# Patient Record
Sex: FEMALE | Race: OTHER | NOT HISPANIC OR LATINO
[De-identification: names, ages, dates, MRNs, and addresses within clinical notes are randomized per-mention and may not be internally consistent; named-entity substitution may affect disease eponyms.]

---

## 2019-07-25 PROBLEM — Z00.00 ENCOUNTER FOR PREVENTIVE HEALTH EXAMINATION: Status: ACTIVE | Noted: 2019-07-25

## 2019-07-26 ENCOUNTER — MEDICATION RENEWAL (OUTPATIENT)
Age: 59
End: 2019-07-26

## 2019-07-27 ENCOUNTER — MEDICATION RENEWAL (OUTPATIENT)
Age: 59
End: 2019-07-27

## 2019-10-23 ENCOUNTER — APPOINTMENT (OUTPATIENT)
Dept: NEUROLOGY | Facility: CLINIC | Age: 59
End: 2019-10-23
Payer: COMMERCIAL

## 2019-10-23 VITALS
HEART RATE: 79 BPM | SYSTOLIC BLOOD PRESSURE: 120 MMHG | BODY MASS INDEX: 24.43 KG/M2 | OXYGEN SATURATION: 99 % | DIASTOLIC BLOOD PRESSURE: 74 MMHG | TEMPERATURE: 97.3 F | HEIGHT: 65 IN | WEIGHT: 146.6 LBS

## 2019-10-23 PROCEDURE — 99214 OFFICE O/P EST MOD 30 MIN: CPT

## 2019-10-24 NOTE — ASSESSMENT
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 The course of last 2 years she did have 4 episodes of at least 3 of them are highly suggestive of syncopal. The last episode brings up the concern with a seizure .in my opinion the timing of being out is not highly suggestive of syncope although the length of the time is also not clear. The symptoms prior to the passing out are also not typical for Syncope.\par I would do a blood level and and EEG and reviewed decide on the dosing and also whether he should do a more prolonged study.

## 2019-10-24 NOTE — HISTORY OF PRESENT ILLNESS
[FreeTextEntry1] : Va is here for followup. She has been seeing me from early 2004 having refractory epilepsy syndrome. 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 Blood issue that they have been having where almost 4 episodes of passing out in the course of last couple of years. At least 2 of the episodes were associated to be experiencing pain followed by that syncopal event. One episode was also immediately after getting blood.\par The last event of the series happened in September 2019. In my opinion this episode is a slightly different activities some aspects of the period she says on that night she went to bowling throughout the day starting from the school she was feeling nauseous and sick in her stomach\par She went to bed like this done sometime early in the morning woke up felt that she has to go to bathroom and the next thing that she remembers is waking up in the bathroom floor bowel incontinence. She says she just cleaned the floor and apparently went back to her bed and slept next morning waking up she felt significant pain in her chest and as it appears she hasn't picture of it she had significant bruises over the top of the left shoulder and left side of the face. She went to the emergency room and it was found out that she had left clavicle fracture. She stayed out of work for a few weeks and then back recently. She denies having had any episode that she couldn't recall seizure she is back to herself her mood is good.\par She denies missing any doses of medication or having any unusual sleep pattern or infection in that period.

## 2019-10-24 NOTE — PHYSICAL EXAM
[FreeTextEntry1] : Va is awake alert oriented x3 in a good mood. She lost some weight. She denies having any issues with appetite. She shows good concentration and attention her memory recall is 3 out of 3 executive behavior is normal. Her pain nerve motor and sensory exam are also normal there is no field cut.\par Romberg is negative.

## 2019-11-12 ENCOUNTER — APPOINTMENT (OUTPATIENT)
Dept: NEUROLOGY | Facility: CLINIC | Age: 59
End: 2019-11-12
Payer: COMMERCIAL

## 2019-11-12 PROCEDURE — 95816 EEG AWAKE AND DROWSY: CPT

## 2020-10-20 ENCOUNTER — APPOINTMENT (OUTPATIENT)
Dept: NEUROLOGY | Facility: CLINIC | Age: 60
End: 2020-10-20
Payer: COMMERCIAL

## 2020-10-20 VITALS
TEMPERATURE: 97.3 F | OXYGEN SATURATION: 98 % | HEART RATE: 68 BPM | HEIGHT: 65 IN | SYSTOLIC BLOOD PRESSURE: 122 MMHG | BODY MASS INDEX: 25.49 KG/M2 | DIASTOLIC BLOOD PRESSURE: 78 MMHG | WEIGHT: 153 LBS

## 2020-10-20 PROCEDURE — 99072 ADDL SUPL MATRL&STAF TM PHE: CPT

## 2020-10-20 PROCEDURE — 99214 OFFICE O/P EST MOD 30 MIN: CPT

## 2020-10-21 NOTE — HISTORY OF PRESENT ILLNESS
[FreeTextEntry1] : Todd is here for the F/U. she is working from home and denies having had any episode suggestive of seizure. She  is maintaining a regular schedule of sleep and daily activities. Did not gain weight and her moods are good.She is planning to retire in 6 months. Expecting a grand child and is excited about it.\par No issues with memory.\kriss Does not do a regular exercise but is going for walks frequently\par Did not visit her PMD .Not complaining of spinal or joint pain\kriss Did not have a dizzy  or presyncopal event

## 2020-10-21 NOTE — PHYSICAL EXAM
[FreeTextEntry1] : A/A/OX 3\par good mood /slightly flat affect\par follows 4 step commands\par crosses the midline well\par  good attention\par normal language\par normal motor\par stable gait\par no dysmetria\par Good posture\par negative Romberg\par

## 2020-10-21 NOTE — ASSESSMENT
[FreeTextEntry1] : S/P right temporal lobectomy for refractory partial epilepsy\par Doing well\par \par Plan\par will continue the current dose of the Pb\par F/U \par Blood test\par

## 2021-04-27 ENCOUNTER — APPOINTMENT (OUTPATIENT)
Dept: NEUROLOGY | Facility: CLINIC | Age: 61
End: 2021-04-27
Payer: COMMERCIAL

## 2021-04-27 VITALS
DIASTOLIC BLOOD PRESSURE: 70 MMHG | HEIGHT: 64 IN | TEMPERATURE: 97.7 F | OXYGEN SATURATION: 99 % | HEART RATE: 82 BPM | BODY MASS INDEX: 26.29 KG/M2 | WEIGHT: 154 LBS | SYSTOLIC BLOOD PRESSURE: 120 MMHG

## 2021-04-27 PROCEDURE — 99213 OFFICE O/P EST LOW 20 MIN: CPT

## 2021-04-27 PROCEDURE — 99072 ADDL SUPL MATRL&STAF TM PHE: CPT

## 2021-04-30 NOTE — PHYSICAL EXAM
[FreeTextEntry1] : A/A/Ox3\par follows 4 step commands\par crosses the midline well\par good mood\par good attention\par normal language\par CN-- normal\par motor-- normal\par sensory-- normal\par stable gait

## 2021-04-30 NOTE — ASSESSMENT
[FreeTextEntry1] : S/P  Right temporal lobectomy for refractory seizures\par doing very well\par no known seizures since surgery\par \par She wants to come off AEd\par considering the EEG done the decision has been to continue the Pb.\par Now considering the osteoporosis will  start switching her to Keppra and taper off the Pb.\par will send her the schedule for tapering and titrating and F/U

## 2021-04-30 NOTE — HISTORY OF PRESENT ILLNESS
[FreeTextEntry1] : Todd is here for the F/u\par She is doing well in regard to the seizures.\par No seizures. no events suspicious for sz, She says her mood is good. Now is more relaxed.\par planning to retire. sleeps good. feels rested in the morning\par No excessive sleepiness during the day.\par No HA\par No cervical pain\par Occasionally feels pain in the calves.\par She says she had a bone density scan done through her PMD that showed osteoporosis.\par She also had a blood  test done that shows normal VIt D and a phenobarbital level of 19.2 and. high cholesterol.\par \par

## 2021-05-07 RX ORDER — UREA 10 %
50 LOTION (ML) TOPICAL DAILY
Qty: 30 | Refills: 3 | Status: ACTIVE | COMMUNITY
Start: 2021-05-07 | End: 1900-01-01

## 2021-10-28 ENCOUNTER — APPOINTMENT (OUTPATIENT)
Dept: NEUROLOGY | Facility: CLINIC | Age: 61
End: 2021-10-28
Payer: COMMERCIAL

## 2021-10-28 VITALS
SYSTOLIC BLOOD PRESSURE: 122 MMHG | OXYGEN SATURATION: 99 % | WEIGHT: 151 LBS | DIASTOLIC BLOOD PRESSURE: 70 MMHG | TEMPERATURE: 97.9 F | HEIGHT: 64 IN | BODY MASS INDEX: 25.78 KG/M2 | HEART RATE: 69 BPM

## 2021-10-28 PROCEDURE — 99213 OFFICE O/P EST LOW 20 MIN: CPT

## 2021-10-29 NOTE — ASSESSMENT
[FreeTextEntry1] : S/P right temporal lobectomy\par Osteopenia\par \par \par Plan\par continue with the plan to taper off the Pb.\par continue Keppra\par F/U

## 2021-10-29 NOTE — HISTORY OF PRESENT ILLNESS
[FreeTextEntry1] : Todd is here for the F/u. She is doing very well. not reporting any episodes/ events\par She is tolerating Keppra well.\par she is still taking one tablet of 60 mg phenobab every night.\par She is happy that she is retired. now has a grand daughter that she babysits 2 days/week\par She is also going to a [employer] with swimming pool. happy about it.\par sleeps well\par did not gain weight'\par did have her blood test done that shows a keppra level of 11.9\par no neck or back pain\par describes her mood as good\par

## 2021-10-29 NOTE — PHYSICAL EXAM
[FreeTextEntry1] : A/A/Ox3\par follows 4 step commands \par crosses the mid line well\par good mood\par normal language\par good attention\par Cn- normal\par motor--prieto\par stable gait

## 2022-04-25 ENCOUNTER — APPOINTMENT (OUTPATIENT)
Dept: NEUROLOGY | Facility: CLINIC | Age: 62
End: 2022-04-25
Payer: COMMERCIAL

## 2022-04-25 VITALS
BODY MASS INDEX: 25.61 KG/M2 | OXYGEN SATURATION: 96 % | WEIGHT: 150 LBS | HEART RATE: 71 BPM | SYSTOLIC BLOOD PRESSURE: 120 MMHG | HEIGHT: 64 IN | DIASTOLIC BLOOD PRESSURE: 76 MMHG | TEMPERATURE: 97.8 F

## 2022-04-25 PROCEDURE — 95816 EEG AWAKE AND DROWSY: CPT

## 2022-04-25 PROCEDURE — 99213 OFFICE O/P EST LOW 20 MIN: CPT

## 2022-04-26 NOTE — HISTORY OF PRESENT ILLNESS
[FreeTextEntry1] : Todd is here for the F/u. she appears tired  due to the fact that last night her daughter has had a significant rash and she was up from 2 am.\par She is doing well. No major issues.\par she is now retired . Goes to [employer] twice a week. Everyday as a habit wakes up early to watch the sunrise,\par She usually goes to bed between 10.30 and 11.30.\par The number of hours that she gets is about 6 hours and during that also wakes up to go to the bathroom.\par despite that for the most part does not feel tired during the day and does not have excessive sleepiness.\par She says her mood and memory are the same.\par She keeps herself active\par No events suspicious for seizure.\par No HA\par No spinal pain\par did not do her blood level\par Did an EEG today in the office

## 2022-04-26 NOTE — PHYSICAL EXAM
[FreeTextEntry1] : A/A/Ox3\par good mood \par good attention\par follows 4 step commands\par crosses the midline well\par No drift\par No dysmetyria\par stable gait\par

## 2022-04-26 NOTE — ASSESSMENT
[FreeTextEntry1] : S/P right temporal lobectomy for refractory epilepsy\par \par doing well\par will continue current dose\par F/u\par level

## 2022-12-08 ENCOUNTER — APPOINTMENT (OUTPATIENT)
Dept: NEUROLOGY | Facility: CLINIC | Age: 62
End: 2022-12-08

## 2022-12-08 VITALS
DIASTOLIC BLOOD PRESSURE: 80 MMHG | SYSTOLIC BLOOD PRESSURE: 129 MMHG | BODY MASS INDEX: 27.33 KG/M2 | HEART RATE: 88 BPM | TEMPERATURE: 97.6 F | WEIGHT: 166 LBS | OXYGEN SATURATION: 97 % | HEIGHT: 65.5 IN

## 2022-12-08 PROCEDURE — 99214 OFFICE O/P EST MOD 30 MIN: CPT

## 2022-12-08 RX ORDER — PHENOBARBITAL 60 MG/1
60 TABLET ORAL TWICE DAILY
Qty: 180 | Refills: 0 | Status: DISCONTINUED | COMMUNITY
Start: 2019-07-26 | End: 2022-12-08

## 2022-12-08 NOTE — ASSESSMENT
[FreeTextEntry1] : S/P right temporal lobe resection for epileopsy\par doing well . \par \par plan levels F/U

## 2022-12-08 NOTE — HISTORY OF PRESENT ILLNESS
[FreeTextEntry1] : Todd, is here for the F/U. she came with her 1 Y/O grand son. She is happy and denies having had any events suggestive of seizure. She sleeps well. good mood\par Keeps herself busy.\par Saw her PMD  couple of months ago . she says now she is having occasional neck tension and pain and also LBP\par No  radiating pain to the arms or legs.\par no falls\par At times she also feels heavy ness and tension in the head.\par No Change in the bladder'\par NO change in the balance and the vision\par a blood test done in May , shows normal CBC , CMP and Keppra level of 16.1 and Vit D 35\par

## 2022-12-08 NOTE — PHYSICAL EXAM
[FreeTextEntry1] : A/A/Ox3\par good mood \par good attention\par follows 4 step commands\par normal language\par CN- normal\par NO drift\par no dysmetria'\par stable gaitaffecting right turn more than left\par slight pain and tension in the neck in surrender position

## 2023-06-01 ENCOUNTER — APPOINTMENT (OUTPATIENT)
Dept: NEUROLOGY | Facility: CLINIC | Age: 63
End: 2023-06-01
Payer: COMMERCIAL

## 2023-06-01 VITALS
HEIGHT: 65.5 IN | HEART RATE: 68 BPM | SYSTOLIC BLOOD PRESSURE: 146 MMHG | BODY MASS INDEX: 25.52 KG/M2 | OXYGEN SATURATION: 98 % | DIASTOLIC BLOOD PRESSURE: 90 MMHG | TEMPERATURE: 98.6 F | WEIGHT: 155 LBS

## 2023-06-01 VITALS — SYSTOLIC BLOOD PRESSURE: 134 MMHG | HEART RATE: 75 BPM | DIASTOLIC BLOOD PRESSURE: 91 MMHG

## 2023-06-01 PROCEDURE — 99214 OFFICE O/P EST MOD 30 MIN: CPT

## 2023-06-02 NOTE — PHYSICAL EXAM
[FreeTextEntry1] : A/A/Ox3\par follows step commands\par good mood\par slightly tense and tight\par no nystagmus. CN exam-- normal\par guardado lateralizing to the left\par no drift\par no dysmetria\par negative Romberg\par stable gait \par sable to walk Tantum\par \par

## 2023-06-02 NOTE — HISTORY OF PRESENT ILLNESS
[FreeTextEntry1] : Todd is here for the F/U after having a sudden onset of vertigo last week.\par According to her she woke up to go to the bathroom . felt the room was spinning\par she was also slightly nauseous.\par She manage to go to the bathroom . coming back and sitting in the bed was slightly better but it really became worse/ severe after she tried to lay back.\par This was so severe that she called her son , they went to ED. Some W/U was done and all were negative\par She was given Mclezine and did slightly better with some fluctuations\par Not having a F/U appointment with me , she saw another neurologist in [facility] d also has an appointment with ENT\par She says throughout the whole event this was the only symptom that she had.\par Denies double vison, difficulty with speaking, blurred vision , weakness and or dysesthesia\par No HA\par Gradually doing better\par no cervical pain\par slight ear pain\par not particularly complaining of hearing issues\par \par

## 2023-06-02 NOTE — ASSESSMENT
[FreeTextEntry1] : 1- s/p right temporal lobectomy for refractory epilepsy\par 2- vertigo. ( peripheral\par \par \par plan F/U ENT\par Vestibular rehab

## 2023-06-21 ENCOUNTER — APPOINTMENT (OUTPATIENT)
Dept: OTOLARYNGOLOGY | Facility: CLINIC | Age: 63
End: 2023-06-21
Payer: COMMERCIAL

## 2023-06-21 PROCEDURE — 99204 OFFICE O/P NEW MOD 45 MIN: CPT

## 2023-06-21 RX ORDER — ALENDRONATE SODIUM 35 MG/1
TABLET ORAL
Refills: 0 | Status: ACTIVE | COMMUNITY

## 2023-06-21 NOTE — HISTORY OF PRESENT ILLNESS
[de-identified] : 63F presents with concern for dizziness 3 weeks ago. Started when she got up out of bed to go to the bathroom. She reports "the whole world was spinning." This occurs when she changes positions quickly. She also has imbalance as well. She's been taking Meclizine PRN. Feels like she may have some hearing loss and bilateral tinnitus. Denies otalgia, otorrhea. She's been doing vestibular therapy. No other ENT issues.

## 2023-07-10 ENCOUNTER — APPOINTMENT (OUTPATIENT)
Dept: OTOLARYNGOLOGY | Facility: CLINIC | Age: 63
End: 2023-07-10
Payer: COMMERCIAL

## 2023-07-10 PROCEDURE — 92550 TYMPANOMETRY & REFLEX THRESH: CPT | Mod: 52

## 2023-07-10 PROCEDURE — 92557 COMPREHENSIVE HEARING TEST: CPT

## 2023-07-10 PROCEDURE — 92540 BASIC VESTIBULAR EVALUATION: CPT

## 2023-07-10 PROCEDURE — 92537 CALORIC VSTBLR TEST W/REC: CPT

## 2023-07-14 ENCOUNTER — APPOINTMENT (OUTPATIENT)
Dept: OTOLARYNGOLOGY | Facility: CLINIC | Age: 63
End: 2023-07-14
Payer: COMMERCIAL

## 2023-07-14 PROCEDURE — 99214 OFFICE O/P EST MOD 30 MIN: CPT

## 2023-07-14 RX ORDER — MECLIZINE HYDROCHLORIDE 12.5 MG/1
12.5 TABLET ORAL 3 TIMES DAILY
Qty: 36 | Refills: 3 | Status: ACTIVE | COMMUNITY
Start: 2023-07-14 | End: 1900-01-01

## 2023-07-14 NOTE — DATA REVIEWED
[de-identified] : -\par 07/10/2023.\par \par Summary:\par Hearing: WNL sloping to a mild SNHL rising to hearing WNL, right ear and hearing WNL, left ear\par Tymps:\par \par VNG \par VNG test showed clinically significant nystagmus in the head left and body left tests, as well as, low level nystagmus in the head center and body tests. Cannot rule out peripheral vestibular pathology. Continue vestibular pathology.

## 2023-07-14 NOTE — HISTORY OF PRESENT ILLNESS
[de-identified] : 63F presents with concern for dizziness 3 weeks ago. Started when she got up out of bed to go to the bathroom. She reports "the whole world was spinning." This occurs when she changes positions quickly. She also has imbalance as well. She's been taking Meclizine PRN. Feels like she may have some hearing loss and bilateral tinnitus. Denies otalgia, otorrhea. She's been doing vestibular therapy. No other ENT issues.  [FreeTextEntry1] : Pt reports improvement in symptoms. She has not had any episodes of vertigo since the initial one. She is working with vestibular therapy. Neurology note reviewed 6/2/23.

## 2023-07-28 ENCOUNTER — APPOINTMENT (OUTPATIENT)
Dept: OTOLARYNGOLOGY | Facility: CLINIC | Age: 63
End: 2023-07-28
Payer: COMMERCIAL

## 2023-07-28 DIAGNOSIS — H90.3 SENSORINEURAL HEARING LOSS, BILATERAL: ICD-10-CM

## 2023-07-28 PROCEDURE — 99443: CPT

## 2023-07-28 NOTE — HISTORY OF PRESENT ILLNESS
[FreeTextEntry1] : Pt is overall improving with therapy. Pt only has occasional moments of unsteadiness. Pt has meclizine as needed. MRA, VNG,  were reviewed that showed asymmetric snhl, nonspecific VNG findings, and normal MRA.

## 2023-12-12 ENCOUNTER — APPOINTMENT (OUTPATIENT)
Dept: NEUROLOGY | Facility: CLINIC | Age: 63
End: 2023-12-12
Payer: COMMERCIAL

## 2023-12-12 VITALS
SYSTOLIC BLOOD PRESSURE: 129 MMHG | WEIGHT: 167.44 LBS | TEMPERATURE: 97.6 F | HEIGHT: 66 IN | OXYGEN SATURATION: 100 % | DIASTOLIC BLOOD PRESSURE: 70 MMHG | HEART RATE: 67 BPM | BODY MASS INDEX: 26.91 KG/M2

## 2023-12-12 DIAGNOSIS — R42 DIZZINESS AND GIDDINESS: ICD-10-CM

## 2023-12-12 PROCEDURE — 99213 OFFICE O/P EST LOW 20 MIN: CPT

## 2023-12-16 PROBLEM — R42 VERTIGO: Status: ACTIVE | Noted: 2023-06-01

## 2023-12-16 NOTE — ASSESSMENT
[FreeTextEntry1] : 1-S/P Temporal lobe resection for refractory epilepsy doing well 2-vertigo   plan continue level F/U

## 2023-12-16 NOTE — PHYSICAL EXAM
[FreeTextEntry1] : A/A/Ox3 good mood Good attention follows 4 step commands normal language CN- normal No drift No dysmetria stable gait

## 2023-12-16 NOTE — HISTORY OF PRESENT ILLNESS
[FreeTextEntry1] : Todd is here for the F/U She is at her baseline , overall happy . Busy doing her household work and going to walks and the GYM and also seeing friends. She is scheduled for a trip to [place] She says her mood is good and so is her sleep Her Vertigo is significantly better with the vestibular rehab.. She gained few pounds No Ha No events suggestive of SZ. She is seeing her PMD  No blood test done

## 2024-01-10 RX ORDER — LEVETIRACETAM 250 MG/1
250 TABLET, FILM COATED ORAL
Qty: 450 | Refills: 1 | Status: ACTIVE | COMMUNITY
Start: 2021-05-07 | End: 1900-01-01

## 2024-06-17 ENCOUNTER — APPOINTMENT (OUTPATIENT)
Dept: NEUROLOGY | Facility: CLINIC | Age: 64
End: 2024-06-17
Payer: COMMERCIAL

## 2024-06-17 VITALS
WEIGHT: 164.25 LBS | TEMPERATURE: 98.1 F | HEART RATE: 88 BPM | SYSTOLIC BLOOD PRESSURE: 129 MMHG | RESPIRATION RATE: 20 BRPM | BODY MASS INDEX: 28.04 KG/M2 | DIASTOLIC BLOOD PRESSURE: 79 MMHG | HEIGHT: 64 IN | OXYGEN SATURATION: 99 %

## 2024-06-17 DIAGNOSIS — G40.909 EPILEPSY, UNSPECIFIED, NOT INTRACTABLE, W/OUT STATUS EPILEPTICUS: ICD-10-CM

## 2024-06-17 PROCEDURE — G2211 COMPLEX E/M VISIT ADD ON: CPT | Mod: NC

## 2024-06-17 PROCEDURE — 99213 OFFICE O/P EST LOW 20 MIN: CPT

## 2024-06-17 NOTE — PHYSICAL EXAM
[FreeTextEntry1] : A/A/Ox3 good mood good attention follows 4 step commands normal language CN- normal NO drift no dysmetria' stable gait  able to tandem walk slight tension in the neck in surrender position.

## 2024-06-17 NOTE — HISTORY OF PRESENT ILLNESS
[FreeTextEntry1] : Todd is here for the F/U No seizures No events suggestive of Sz. She is happy  . Now3 has two grandchildren. 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 [place] pool couple of times/week and also to the PT Her sleep is good/Ok she describes her mood as good occasionally has knee pain and also every couple of weeks has a brief episode of vertigo often it is very mild and comes on with vertical moves  If it comes on multiple times then she takes half of the Antivert that is always helpful No radiating pain No fall

## 2024-06-17 NOTE — ASSESSMENT
[FreeTextEntry1] : S/P right temporal lobe resection for epilepsy   plan check the levels that she did two weeks ago continue meds continue the PT

## 2024-06-20 RX ORDER — PHENOBARBITAL 15 MG/1
15 TABLET ORAL
Qty: 180 | Refills: 1 | Status: ACTIVE | COMMUNITY
Start: 2022-12-08 | End: 1900-01-01

## 2024-12-02 ENCOUNTER — APPOINTMENT (OUTPATIENT)
Dept: NEUROLOGY | Facility: CLINIC | Age: 64
End: 2024-12-02
Payer: COMMERCIAL

## 2024-12-02 ENCOUNTER — NON-APPOINTMENT (OUTPATIENT)
Age: 64
End: 2024-12-02

## 2024-12-02 VITALS
BODY MASS INDEX: 28.66 KG/M2 | OXYGEN SATURATION: 99 % | HEIGHT: 65 IN | RESPIRATION RATE: 20 BRPM | HEART RATE: 64 BPM | WEIGHT: 172 LBS | SYSTOLIC BLOOD PRESSURE: 118 MMHG | DIASTOLIC BLOOD PRESSURE: 78 MMHG

## 2024-12-02 PROCEDURE — G2211 COMPLEX E/M VISIT ADD ON: CPT | Mod: NC

## 2024-12-02 PROCEDURE — 99213 OFFICE O/P EST LOW 20 MIN: CPT

## 2024-12-03 DIAGNOSIS — G40.909 EPILEPSY, UNSPECIFIED, NOT INTRACTABLE, W/OUT STATUS EPILEPTICUS: ICD-10-CM

## 2025-06-12 ENCOUNTER — NON-APPOINTMENT (OUTPATIENT)
Age: 65
End: 2025-06-12

## 2025-06-12 ENCOUNTER — APPOINTMENT (OUTPATIENT)
Dept: NEUROLOGY | Facility: CLINIC | Age: 65
End: 2025-06-12
Payer: MEDICARE

## 2025-06-12 VITALS
OXYGEN SATURATION: 99 % | RESPIRATION RATE: 20 BRPM | HEART RATE: 78 BPM | WEIGHT: 166 LBS | BODY MASS INDEX: 27.66 KG/M2 | SYSTOLIC BLOOD PRESSURE: 121 MMHG | DIASTOLIC BLOOD PRESSURE: 88 MMHG | HEIGHT: 65 IN

## 2025-06-12 PROCEDURE — 99213 OFFICE O/P EST LOW 20 MIN: CPT

## 2025-06-12 PROCEDURE — 95816 EEG AWAKE AND DROWSY: CPT
